# Patient Record
Sex: FEMALE | Race: BLACK OR AFRICAN AMERICAN
[De-identification: names, ages, dates, MRNs, and addresses within clinical notes are randomized per-mention and may not be internally consistent; named-entity substitution may affect disease eponyms.]

---

## 2018-08-15 ENCOUNTER — HOSPITAL ENCOUNTER (OUTPATIENT)
Dept: HOSPITAL 92 - TBSIIMAG | Age: 48
Discharge: HOME | End: 2018-08-15
Attending: NEUROLOGICAL SURGERY
Payer: COMMERCIAL

## 2018-08-15 DIAGNOSIS — M54.5: Primary | ICD-10-CM

## 2018-08-15 DIAGNOSIS — S32.019D: ICD-10-CM

## 2018-08-15 PROCEDURE — 72100 X-RAY EXAM L-S SPINE 2/3 VWS: CPT
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
